# Patient Record
Sex: MALE | Race: WHITE
[De-identification: names, ages, dates, MRNs, and addresses within clinical notes are randomized per-mention and may not be internally consistent; named-entity substitution may affect disease eponyms.]

---

## 2023-04-26 ENCOUNTER — HOSPITAL ENCOUNTER (EMERGENCY)
Dept: HOSPITAL 94 - ER | Age: 28
Discharge: HOME | End: 2023-04-26
Payer: SELF-PAY

## 2023-04-26 VITALS — BODY MASS INDEX: 28.69 KG/M2 | HEIGHT: 70 IN | WEIGHT: 200.42 LBS

## 2023-04-26 DIAGNOSIS — R07.89: Primary | ICD-10-CM

## 2023-04-26 DIAGNOSIS — R51.9: ICD-10-CM

## 2023-04-26 PROCEDURE — 99283 EMERGENCY DEPT VISIT LOW MDM: CPT

## 2023-04-26 NOTE — NUR
PT REFUSED ALL CARE FROM RN'S/LVN'S AND EMT'S. NO VS OR EKG COMPLETED AT THIS 
TIME. MD AWARE. 



PT SITTING UPRIGHT, TALKING TO STAFF, & BEING UNCOOPERATIVE.

## 2023-04-26 NOTE — NUR
PT REFUSED FOR RN TO TAKE VS, PERFORM EKG, AND ASSESSMENT. PT REFUSED TO GIVE 
HOME ADDRESS AND REFUSED DISCHARGE PAPERWORK. PT WAS NOT MEDICALLY CLEARED. PT 
LEFT THE ED AMBULATORY. PAPERWORK WAS PROVIDED TO THE POLICE.